# Patient Record
Sex: FEMALE
[De-identification: names, ages, dates, MRNs, and addresses within clinical notes are randomized per-mention and may not be internally consistent; named-entity substitution may affect disease eponyms.]

---

## 2017-04-20 ENCOUNTER — HOSPITAL ENCOUNTER (OUTPATIENT)
Dept: HOSPITAL 53 - M LAB REF | Age: 77
End: 2017-04-20
Attending: INTERNAL MEDICINE
Payer: MEDICARE

## 2017-04-20 DIAGNOSIS — R31.9: Primary | ICD-10-CM

## 2017-04-20 LAB
BACTERIA URNS QL MICRO: (no result)
MICROSCOPIC EXAM: (no result)
RBC #/AREA URNS HPF: (no result) /HPF (ref 0–3)
SQUAMOUS URNS QL MICRO: (no result) /HPF
WBC #/AREA URNS HPF: (no result) /HPF (ref 0–3)

## 2019-10-22 ENCOUNTER — HOSPITAL ENCOUNTER (OUTPATIENT)
Dept: HOSPITAL 53 - M LAB REF | Age: 79
End: 2019-10-22
Attending: INTERNAL MEDICINE
Payer: MEDICARE

## 2019-10-22 DIAGNOSIS — E03.9: Primary | ICD-10-CM

## 2019-12-14 ENCOUNTER — HOSPITAL ENCOUNTER (INPATIENT)
Dept: HOSPITAL 53 - M ED | Age: 79
DRG: 296 | End: 2019-12-14
Attending: SPECIALIST | Admitting: SPECIALIST
Payer: MEDICARE

## 2019-12-14 VITALS — DIASTOLIC BLOOD PRESSURE: 58 MMHG | SYSTOLIC BLOOD PRESSURE: 116 MMHG

## 2019-12-14 VITALS — DIASTOLIC BLOOD PRESSURE: 42 MMHG | SYSTOLIC BLOOD PRESSURE: 84 MMHG

## 2019-12-14 VITALS — SYSTOLIC BLOOD PRESSURE: 32 MMHG | DIASTOLIC BLOOD PRESSURE: 17 MMHG

## 2019-12-14 VITALS — SYSTOLIC BLOOD PRESSURE: 99 MMHG | DIASTOLIC BLOOD PRESSURE: 54 MMHG

## 2019-12-14 VITALS — DIASTOLIC BLOOD PRESSURE: 51 MMHG | SYSTOLIC BLOOD PRESSURE: 96 MMHG

## 2019-12-14 VITALS — SYSTOLIC BLOOD PRESSURE: 97 MMHG | DIASTOLIC BLOOD PRESSURE: 40 MMHG

## 2019-12-14 VITALS — DIASTOLIC BLOOD PRESSURE: 43 MMHG | SYSTOLIC BLOOD PRESSURE: 110 MMHG

## 2019-12-14 VITALS — DIASTOLIC BLOOD PRESSURE: 50 MMHG | SYSTOLIC BLOOD PRESSURE: 112 MMHG

## 2019-12-14 VITALS — SYSTOLIC BLOOD PRESSURE: 76 MMHG | DIASTOLIC BLOOD PRESSURE: 47 MMHG

## 2019-12-14 VITALS — DIASTOLIC BLOOD PRESSURE: 27 MMHG | SYSTOLIC BLOOD PRESSURE: 50 MMHG

## 2019-12-14 VITALS — DIASTOLIC BLOOD PRESSURE: 57 MMHG | SYSTOLIC BLOOD PRESSURE: 100 MMHG

## 2019-12-14 VITALS — SYSTOLIC BLOOD PRESSURE: 15 MMHG | DIASTOLIC BLOOD PRESSURE: 13 MMHG

## 2019-12-14 VITALS — SYSTOLIC BLOOD PRESSURE: 141 MMHG | DIASTOLIC BLOOD PRESSURE: 51 MMHG

## 2019-12-14 VITALS — DIASTOLIC BLOOD PRESSURE: 16 MMHG | SYSTOLIC BLOOD PRESSURE: 27 MMHG

## 2019-12-14 VITALS — DIASTOLIC BLOOD PRESSURE: 41 MMHG | SYSTOLIC BLOOD PRESSURE: 83 MMHG

## 2019-12-14 VITALS — DIASTOLIC BLOOD PRESSURE: 18 MMHG | SYSTOLIC BLOOD PRESSURE: 36 MMHG

## 2019-12-14 VITALS — SYSTOLIC BLOOD PRESSURE: 90 MMHG | DIASTOLIC BLOOD PRESSURE: 43 MMHG

## 2019-12-14 VITALS — DIASTOLIC BLOOD PRESSURE: 55 MMHG | SYSTOLIC BLOOD PRESSURE: 104 MMHG

## 2019-12-14 VITALS — DIASTOLIC BLOOD PRESSURE: 13 MMHG | SYSTOLIC BLOOD PRESSURE: 18 MMHG

## 2019-12-14 VITALS — DIASTOLIC BLOOD PRESSURE: 13 MMHG | SYSTOLIC BLOOD PRESSURE: 21 MMHG

## 2019-12-14 VITALS — DIASTOLIC BLOOD PRESSURE: 33 MMHG | SYSTOLIC BLOOD PRESSURE: 62 MMHG

## 2019-12-14 VITALS — DIASTOLIC BLOOD PRESSURE: 22 MMHG | SYSTOLIC BLOOD PRESSURE: 43 MMHG

## 2019-12-14 VITALS — SYSTOLIC BLOOD PRESSURE: 85 MMHG | DIASTOLIC BLOOD PRESSURE: 52 MMHG

## 2019-12-14 VITALS — SYSTOLIC BLOOD PRESSURE: 104 MMHG | DIASTOLIC BLOOD PRESSURE: 43 MMHG

## 2019-12-14 VITALS — DIASTOLIC BLOOD PRESSURE: 43 MMHG | SYSTOLIC BLOOD PRESSURE: 90 MMHG

## 2019-12-14 VITALS — SYSTOLIC BLOOD PRESSURE: 56 MMHG | DIASTOLIC BLOOD PRESSURE: 30 MMHG

## 2019-12-14 VITALS — DIASTOLIC BLOOD PRESSURE: 24 MMHG | SYSTOLIC BLOOD PRESSURE: 47 MMHG

## 2019-12-14 VITALS — SYSTOLIC BLOOD PRESSURE: 17 MMHG | DIASTOLIC BLOOD PRESSURE: 13 MMHG

## 2019-12-14 VITALS — DIASTOLIC BLOOD PRESSURE: 55 MMHG | SYSTOLIC BLOOD PRESSURE: 106 MMHG

## 2019-12-14 VITALS — SYSTOLIC BLOOD PRESSURE: 121 MMHG | DIASTOLIC BLOOD PRESSURE: 47 MMHG

## 2019-12-14 VITALS — DIASTOLIC BLOOD PRESSURE: 304 MMHG | SYSTOLIC BLOOD PRESSURE: 305 MMHG

## 2019-12-14 VITALS — DIASTOLIC BLOOD PRESSURE: 13 MMHG | SYSTOLIC BLOOD PRESSURE: 13 MMHG

## 2019-12-14 VITALS — SYSTOLIC BLOOD PRESSURE: 113 MMHG | DIASTOLIC BLOOD PRESSURE: 43 MMHG

## 2019-12-14 VITALS — DIASTOLIC BLOOD PRESSURE: 52 MMHG | SYSTOLIC BLOOD PRESSURE: 100 MMHG

## 2019-12-14 VITALS — DIASTOLIC BLOOD PRESSURE: 31 MMHG | SYSTOLIC BLOOD PRESSURE: 58 MMHG

## 2019-12-14 VITALS — DIASTOLIC BLOOD PRESSURE: 59 MMHG | SYSTOLIC BLOOD PRESSURE: 164 MMHG

## 2019-12-14 VITALS — DIASTOLIC BLOOD PRESSURE: 68 MMHG | SYSTOLIC BLOOD PRESSURE: 193 MMHG

## 2019-12-14 VITALS — SYSTOLIC BLOOD PRESSURE: 173 MMHG | DIASTOLIC BLOOD PRESSURE: 62 MMHG

## 2019-12-14 VITALS — SYSTOLIC BLOOD PRESSURE: 103 MMHG | DIASTOLIC BLOOD PRESSURE: 44 MMHG

## 2019-12-14 VITALS — DIASTOLIC BLOOD PRESSURE: 20 MMHG | SYSTOLIC BLOOD PRESSURE: 39 MMHG

## 2019-12-14 DIAGNOSIS — I47.2: ICD-10-CM

## 2019-12-14 DIAGNOSIS — K20.8: ICD-10-CM

## 2019-12-14 DIAGNOSIS — Z66: ICD-10-CM

## 2019-12-14 DIAGNOSIS — E87.4: ICD-10-CM

## 2019-12-14 DIAGNOSIS — K58.9: ICD-10-CM

## 2019-12-14 DIAGNOSIS — A08.4: ICD-10-CM

## 2019-12-14 DIAGNOSIS — Z79.899: ICD-10-CM

## 2019-12-14 DIAGNOSIS — I44.2: ICD-10-CM

## 2019-12-14 DIAGNOSIS — Z95.0: ICD-10-CM

## 2019-12-14 DIAGNOSIS — R57.9: ICD-10-CM

## 2019-12-14 DIAGNOSIS — N17.0: ICD-10-CM

## 2019-12-14 DIAGNOSIS — D69.6: ICD-10-CM

## 2019-12-14 DIAGNOSIS — I46.9: Primary | ICD-10-CM

## 2019-12-14 DIAGNOSIS — I10: ICD-10-CM

## 2019-12-14 DIAGNOSIS — E78.5: ICD-10-CM

## 2019-12-14 LAB
ALBUMIN SERPL BCG-MCNC: 2.3 GM/DL (ref 3.2–5.2)
ALBUMIN SERPL BCG-MCNC: 2.9 GM/DL (ref 3.2–5.2)
ALBUMIN SERPL BCG-MCNC: 3.4 GM/DL (ref 3.2–5.2)
ALT SERPL W P-5'-P-CCNC: 105 U/L (ref 12–78)
ALT SERPL W P-5'-P-CCNC: 129 U/L (ref 12–78)
ALT SERPL W P-5'-P-CCNC: 58 U/L (ref 12–78)
AMYLASE SERPL-CCNC: 14 U/L (ref 25–115)
ANISOCYTOSIS BLD QL SMEAR: (no result)
APTT BLD: 37.7 SECONDS (ref 25–38.4)
APTT BLD: 42.9 SECONDS (ref 25–38.4)
APTT BLD: > 240 SECONDS (ref 25–38.4)
BASE EXCESS BLDA CALC-SCNC: -17.9 MMOL/L (ref -2–2)
BASE EXCESS BLDA CALC-SCNC: -18.2 MMOL/L (ref -2–2)
BASE EXCESS BLDA CALC-SCNC: -20.8 MMOL/L (ref -2–2)
BASE EXCESS BLDA CALC-SCNC: -21 MMOL/L (ref -2–2)
BASE EXCESS BLDA CALC-SCNC: -8.7 MMOL/L (ref -2–2)
BILIRUB CONJ SERPL-MCNC: 0.5 MG/DL (ref 0–0.2)
BILIRUB SERPL-MCNC: 1.3 MG/DL (ref 0.2–1)
BILIRUB SERPL-MCNC: 2.2 MG/DL (ref 0.2–1)
BILIRUB SERPL-MCNC: 2.5 MG/DL (ref 0.2–1)
BUN SERPL-MCNC: 41 MG/DL (ref 7–18)
BUN SERPL-MCNC: 42 MG/DL (ref 7–18)
BUN SERPL-MCNC: 51 MG/DL (ref 7–18)
CALCIUM SERPL-MCNC: 6.9 MG/DL (ref 8.8–10.2)
CALCIUM SERPL-MCNC: 8.1 MG/DL (ref 8.8–10.2)
CALCIUM SERPL-MCNC: 8.8 MG/DL (ref 8.8–10.2)
CHLORIDE SERPL-SCNC: 100 MEQ/L (ref 98–107)
CHLORIDE SERPL-SCNC: 106 MEQ/L (ref 98–107)
CHLORIDE SERPL-SCNC: 107 MEQ/L (ref 98–107)
CK MB CFR.DF SERPL CALC: 0.33
CK MB SERPL-MCNC: 3.9 NG/ML (ref ?–3.6)
CK SERPL-CCNC: 1184 U/L (ref 26–192)
CO2 BLDA CALC-SCNC: 10.1 MEQ/L (ref 23–31)
CO2 BLDA CALC-SCNC: 18 MEQ/L (ref 23–31)
CO2 BLDA CALC-SCNC: 5.2 MEQ/L (ref 23–31)
CO2 BLDA CALC-SCNC: 6.7 MEQ/L (ref 23–31)
CO2 BLDA CALC-SCNC: 7.7 MEQ/L (ref 23–31)
CO2 SERPL-SCNC: 10 MEQ/L (ref 21–32)
CO2 SERPL-SCNC: 15 MEQ/L (ref 21–32)
CO2 SERPL-SCNC: 9 MEQ/L (ref 21–32)
CREAT SERPL-MCNC: 1.15 MG/DL (ref 0.55–1.3)
CREAT SERPL-MCNC: 1.42 MG/DL (ref 0.55–1.3)
CREAT SERPL-MCNC: 1.79 MG/DL (ref 0.55–1.3)
D DIMER PPP DDU-MCNC: > 4000 NG/ML (ref ?–500)
EOSINOPHIL NFR BLD MANUAL: 1 % (ref 0–3)
GFR SERPL CREATININE-BSD FRML MDRD: 29.1 ML/MIN/{1.73_M2} (ref 39–?)
GFR SERPL CREATININE-BSD FRML MDRD: 38 ML/MIN/{1.73_M2} (ref 39–?)
GFR SERPL CREATININE-BSD FRML MDRD: 48.5 ML/MIN/{1.73_M2} (ref 39–?)
GLUCOSE SERPL-MCNC: 159 MG/DL (ref 70–100)
GLUCOSE SERPL-MCNC: 160 MG/DL (ref 70–100)
GLUCOSE SERPL-MCNC: 83 MG/DL (ref 70–100)
HCO3 BLDA-SCNC: 16.9 MEQ/L (ref 22–26)
HCO3 BLDA-SCNC: 4.8 MEQ/L (ref 22–26)
HCO3 BLDA-SCNC: 6.3 MEQ/L (ref 22–26)
HCO3 BLDA-SCNC: 7 MEQ/L (ref 22–26)
HCO3 BLDA-SCNC: 9.2 MEQ/L (ref 22–26)
HCO3 STD BLDA-SCNC: 10.8 MEQ/L (ref 22–26)
HCO3 STD BLDA-SCNC: 11.3 MEQ/L (ref 22–26)
HCO3 STD BLDA-SCNC: 17.5 MEQ/L (ref 22–26)
HCO3 STD BLDA-SCNC: 8.8 MEQ/L (ref 22–26)
HCO3 STD BLDA-SCNC: 9.9 MEQ/L (ref 22–26)
HCT VFR BLD AUTO: 46.6 % (ref 36–47)
HCT VFR BLD AUTO: 46.8 % (ref 36–47)
HCT VFR BLD AUTO: 50 % (ref 36–47)
HGB BLD-MCNC: 13.8 G/DL (ref 12–15.5)
HGB BLD-MCNC: 14 G/DL (ref 12–15.5)
HGB BLD-MCNC: 15.2 G/DL (ref 12–15.5)
INR PPP: 1.6
INR PPP: 1.92
INR PPP: 2.86
LIPASE SERPL-CCNC: 34 U/L (ref 73–393)
LYMPHOCYTES NFR BLD MANUAL: 38 % (ref 16–44)
MACROCYTES BLD QL SMEAR: (no result)
MAGNESIUM SERPL-MCNC: 2.7 MG/DL (ref 1.8–2.4)
MAGNESIUM SERPL-MCNC: 2.8 MG/DL (ref 1.8–2.4)
MAGNESIUM SERPL-MCNC: 2.8 MG/DL (ref 1.8–2.4)
MCH RBC QN AUTO: 30.2 PG (ref 27–33)
MCH RBC QN AUTO: 30.7 PG (ref 27–33)
MCH RBC QN AUTO: 30.8 PG (ref 27–33)
MCHC RBC AUTO-ENTMCNC: 29.5 G/DL (ref 32–36.5)
MCHC RBC AUTO-ENTMCNC: 30 G/DL (ref 32–36.5)
MCHC RBC AUTO-ENTMCNC: 30.4 G/DL (ref 32–36.5)
MCV RBC AUTO: 100.6 FL (ref 80–96)
MCV RBC AUTO: 101 FL (ref 80–96)
MCV RBC AUTO: 104.5 FL (ref 80–96)
METAMYELOCYTES NFR BLD MANUAL: 1 % (ref 0–0)
MONOCYTES NFR BLD MANUAL: 5 % (ref 0–5)
NEUTROPHILS NFR BLD MANUAL: 48 % (ref 28–66)
PCO2 BLDA: 12.1 MMHG (ref 35–45)
PCO2 BLDA: 12.2 MMHG (ref 35–45)
PCO2 BLDA: 21 MMHG (ref 35–45)
PCO2 BLDA: 27.6 MMHG (ref 35–45)
PCO2 BLDA: 31 MMHG (ref 35–45)
PH BLDA: 7.13 UNITS (ref 7.35–7.45)
PH BLDA: 7.14 UNITS (ref 7.35–7.45)
PH BLDA: 7.22 UNITS (ref 7.35–7.45)
PH BLDA: 7.33 UNITS (ref 7.35–7.45)
PH BLDA: 7.34 UNITS (ref 7.35–7.45)
PHOSPHATE SERPL-MCNC: 5.8 MG/DL (ref 2.5–4.9)
PLATELET # BLD AUTO: 80 10^3/UL (ref 150–450)
PLATELET # BLD AUTO: 93 10^3/UL (ref 150–450)
PLATELET # BLD AUTO: 98 10^3/UL (ref 150–450)
PLATELET BLD QL SMEAR: (no result)
PO2 BLDA: 141.2 MMHG (ref 75–100)
PO2 BLDA: 170.2 MMHG (ref 75–100)
PO2 BLDA: 277.9 MMHG (ref 75–100)
PO2 BLDA: 66 MMHG (ref 75–100)
PO2 BLDA: 95.3 MMHG (ref 75–100)
POLYCHROMASIA BLD QL SMEAR: (no result)
POTASSIUM SERPL-SCNC: 4.5 MEQ/L (ref 3.5–5.1)
POTASSIUM SERPL-SCNC: 4.5 MEQ/L (ref 3.5–5.1)
POTASSIUM SERPL-SCNC: 4.7 MEQ/L (ref 3.5–5.1)
PROT SERPL-MCNC: 5 GM/DL (ref 6.4–8.2)
PROT SERPL-MCNC: 6.2 GM/DL (ref 6.4–8.2)
PROT SERPL-MCNC: 6.9 GM/DL (ref 6.4–8.2)
PROTHROMBIN TIME: 18.8 SECONDS (ref 11.8–14)
PROTHROMBIN TIME: 21.7 SECONDS (ref 11.8–14)
PROTHROMBIN TIME: 29.9 SECONDS (ref 11.8–14)
RBC # BLD AUTO: 4.48 10^6/UL (ref 4–5.4)
RBC # BLD AUTO: 4.63 10^6/UL (ref 4–5.4)
RBC # BLD AUTO: 4.95 10^6/UL (ref 4–5.4)
SAO2 % BLDA: 94.4 % (ref 95–99)
SAO2 % BLDA: 96.3 % (ref 95–99)
SAO2 % BLDA: 98.1 % (ref 95–99)
SAO2 % BLDA: 98.8 % (ref 95–99)
SAO2 % BLDA: 99.5 % (ref 95–99)
SODIUM SERPL-SCNC: 136 MEQ/L (ref 136–145)
SODIUM SERPL-SCNC: 137 MEQ/L (ref 136–145)
SODIUM SERPL-SCNC: 141 MEQ/L (ref 136–145)
T4 FREE SERPL-MCNC: 1.13 NG/DL (ref 0.76–1.46)
TROPONIN I SERPL-MCNC: 1.09 NG/ML (ref ?–0.1)
TSH SERPL DL<=0.005 MIU/L-ACNC: 8.49 UIU/ML (ref 0.36–3.74)
VARIANT LYMPHS NFR BLD MANUAL: 2 % (ref 0–5)
WBC # BLD AUTO: 10.1 10^3/UL (ref 4–10)
WBC # BLD AUTO: 22.5 10^3/UL (ref 4–10)
WBC # BLD AUTO: 23.1 10^3/UL (ref 4–10)
WBC TOXIC VACUOLES BLD QL SMEAR: (no result)

## 2019-12-14 PROCEDURE — 06HM33Z INSERTION OF INFUSION DEVICE INTO RIGHT FEMORAL VEIN, PERCUTANEOUS APPROACH: ICD-10-PCS | Performed by: SPECIALIST

## 2019-12-14 PROCEDURE — 5A1935Z RESPIRATORY VENTILATION, LESS THAN 24 CONSECUTIVE HOURS: ICD-10-PCS | Performed by: SPECIALIST

## 2019-12-14 PROCEDURE — 02HV33Z INSERTION OF INFUSION DEVICE INTO SUPERIOR VENA CAVA, PERCUTANEOUS APPROACH: ICD-10-PCS | Performed by: SPECIALIST

## 2019-12-14 PROCEDURE — 0BH17EZ INSERTION OF ENDOTRACHEAL AIRWAY INTO TRACHEA, VIA NATURAL OR ARTIFICIAL OPENING: ICD-10-PCS | Performed by: EMERGENCY MEDICINE

## 2019-12-14 PROCEDURE — 04HY32Z INSERTION OF MONITORING DEVICE INTO LOWER ARTERY, PERCUTANEOUS APPROACH: ICD-10-PCS | Performed by: SPECIALIST

## 2019-12-14 RX ADMIN — SODIUM CHLORIDE SCH MLS/HR: 9 INJECTION, SOLUTION INTRAVENOUS at 14:00

## 2019-12-14 RX ADMIN — DEXTROSE MONOHYDRATE SCH MLS/HR: 50 INJECTION, SOLUTION INTRAVENOUS at 11:32

## 2019-12-14 RX ADMIN — SODIUM CHLORIDE SCH MLS/HR: 9 INJECTION, SOLUTION INTRAVENOUS at 15:00

## 2019-12-14 RX ADMIN — SODIUM CHLORIDE SCH MLS/HR: 9 INJECTION, SOLUTION INTRAVENOUS at 14:30

## 2019-12-14 RX ADMIN — DEXTROSE MONOHYDRATE SCH MLS/HR: 50 INJECTION, SOLUTION INTRAVENOUS at 11:44

## 2019-12-14 RX ADMIN — VECURONIUM BROMIDE SCH MLS/HR: 1 INJECTION, POWDER, LYOPHILIZED, FOR SOLUTION INTRAVENOUS at 12:00

## 2019-12-14 RX ADMIN — Medication SCH MLS/HR: at 11:30

## 2019-12-14 RX ADMIN — Medication SCH MLS/HR: at 09:30

## 2019-12-14 RX ADMIN — SODIUM CHLORIDE SCH MLS/HR: 9 INJECTION, SOLUTION INTRAVENOUS at 14:15

## 2019-12-14 RX ADMIN — DEXTROSE MONOHYDRATE SCH MLS/HR: 50 INJECTION, SOLUTION INTRAVENOUS at 11:30

## 2019-12-14 RX ADMIN — VECURONIUM BROMIDE SCH MLS/HR: 1 INJECTION, POWDER, LYOPHILIZED, FOR SOLUTION INTRAVENOUS at 12:03

## 2019-12-14 RX ADMIN — DEXTROSE MONOHYDRATE SCH MLS/HR: 50 INJECTION, SOLUTION INTRAVENOUS at 12:00

## 2019-12-14 RX ADMIN — Medication SCH MLS/HR: at 09:09

## 2019-12-14 RX ADMIN — SODIUM CHLORIDE SCH MLS/HR: 9 INJECTION, SOLUTION INTRAVENOUS at 13:52

## 2019-12-14 RX ADMIN — SODIUM CHLORIDE SCH MLS/HR: 9 INJECTION, SOLUTION INTRAVENOUS at 14:45

## 2019-12-14 NOTE — CR
DATE OF CONSULTATION:  12/14/2019

 

REFERRING PHYSICIAN:  Dr. Milner

 

INDICATION:  Cardiac arrest.

 

HISTORY OF PRESENT ILLNESS:  Mrs. Fortune is previously unknown to me.  She is

a 79-year-old female who has been followed by my partner Dr. Cha.  She presented

to [facility] ([facility]) emergency room (ER) by ambulance after her

 called [facility] (EMS) because she lost consciousness.

Her  is a frail man, but he is able to provide somewhat limited history.

What we can gather is she has not been feeling well since Thursday.  He felt 
that

it was a consequence of eating some food in a diner.  On Friday, she supposedly

had intermittent vomiting of small amount and a lot of retching but not that 
much

vomitus as he can recall.  Then, this morning she apparently was trying to take 
a

sip of water and then collapsed.  He was able to hold her and let her slide on

the ground.  She at this point was still talking to him and trying to get up but

at some point she lost consciousness.  He called 911.  On arrival of EMS, there

was automatic defibrillator applied and shock was advised and delivered.  When

the paramedic arrived on the scene few minutes later, she was in pulseless

electrical activity.  She received epinephrine and had brief chest compressions.

She was loaded to the ambulance and then developed sustained ventricular

tachycardia that was terminated by two shocks and administration of amiodarone.

On arrival to emergency room, she was in sinus rhythm with asynchronously

ventricular paced rhythm.  She was relatively hemodynamically stable with blood

pressure in 90s and low 100s, and during her ER stay before I saw her and after 
I

saw her there were no arrhythmias.

 

The patient does not have any history of coronary artery disease.  She had an

echocardiogram in June 2019 that revealed preserved left ventricular systolic

function.  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

degree atrioventricular (AV) block and complete heart block and pacemaker

placement in 2006.  The pacemaker was replaced in 2008 after there was

malfunction of atrial lead.  Surprisingly to me she was left with only

ventricular pacemaker programed  in the VVIR mode even though she remained in 
sinus

rhythm.  I interrogated the device in emergency room, and unfortunately, it is

not programmed to store any arrhythmic events and consequently does not provide

any information about the events of earlier today.  She still has about a year

and half of battery life safe and she is pacemaker dependent.

 

Otherwise, past medical history is positive for hypertension, dyslipidemia and

irritable bowel syndrome.  According to her , she had a history of

gastrointestinal (GI) bleeding that is rather remote.

 

Surgical history is positive for pacemaker placement and then revision in 2006

and 2008, and right breast lumpectomy.

 

SOCIAL HISTORY:  The patient does not smoke, does not drink.  She is .

 

FAMILY HISTORY:  No longer relevant.

 

REVIEW OF SYSTEMS:  Unobtainable.  According to her , there were no

unusual events until Thursday evening with GI symptoms.

 

PHYSICAL EXAMINATION:

The patient is intubated, sedated, certainly no communication possible.

Blood pressure 126/49, heart rate 60 beats per minute with underlying sinus

rhythm and asynchronously paced rhythm.  Saturation 94% on 50% FiO2.

Her jugular venous pulse (JVP) looks high.

Lungs are clear to auscultation, though I do not appreciate any wheezing,

crackles or rhonchi.

Heart exam reveals regular rhythm.  I do not appreciate any obvious murmur or

gallop.

Abdomen is soft.  No guarding.  Bowel sounds are present.

Extremities are free of edema.  Peripheral pulses are palpable.

Neurologically, her pupils are constricted.  I do not appreciate any obvious gag

reflex.  I did not test corneal reflex.

Her extremities are rather flaccid

 

LABORATORY:  CBC reveals hemoglobin of 14.0, hematocrit 46, platelet count

98,000, and WBC count 10.1.

 

Basic metabolic panel reveals sodium 136, potassium 4.5, BUN 41, creatinine 1.5,

glucose 160, lactic acid 11, magnesium 2.8, AST 79, ALT 58, troponin 1.09.  CK

1180, relative index 0.33, lipase 34.  TSH 8.5.

 

Urinalysis is positive for 2+ protein and 3+ blood.

 

ASSESSMENT AND PLAN:  Mrs. Fortune is a 79-year-old female without prior

history of coronary artery disease or congestive heart failure, but with 
complete

heart block and only ventricular paced rhythm who presented with ventricular

tachycardia documented in the ambulance and probably ventricular fibrillation on

arrival of EMT.  It is unclear how long she was without effective 
cardiopulmonary

resuscitation (CPR) prior to arrival of emergency room medical technician  
(EMT),

but it is estimated approximately 10-15 minutes.  Currently, she is

hemodynamically stable.  I spoke with Dr. Ga, who is going to be the

admitting attending from critical care, and we plan to perform cooling protocol

under her direction. From cardiac perspective, at this point I believe that the

principal outcome will be dependent on neurologic function.  I do not see any

immediate indication for patient going to the cath lab.  She does have

uninterpretable EKG  due to underlying ventricular pacing and her troponin

elevation that is fairly minimal and probably reflects the ventricular

tachycardia and CPR.  We will provide purely supportive therapy.  She has

coffee-ground material in her nasogastric (NG) tube and has low platelet count,

and consequently, I believe that we should stay away from administration of

anticoagulation until we can determine that her hemoglobin/hematocrit remain

stable.  I also will not administer any antiarrhythmics unless she has recurrent

arrhythmias, at which point amiodarone will be administered.

 

I spoke with her  and daughter-in-law and explained that unfortunately 
her

prognosis is very poor, statistically speaking her chances of leaving hospital 
in

favorable condition are less than 5%.  I asked the family to consider if further

resuscitative measures would be administered should she have any the

decompensation.  At this point, we will provide full support.

REYNA

## 2019-12-14 NOTE — HPE
DATE OF ADMISSION:  12/14/2019

 

CHIEF COMPLAINT: Cardiac arrest.

 

HISTORY OF PRESENT ILLNESS:  Ms. Fortune is a 79-year-old female with a past

medical history of high-degree atrioventricular (AV) block and a complete heart

block status post pacemaker, who presented to the emergency department (ED) with

loss of consciousness.  History was obtained from the patient's  as well

as collateral information, as she is intubated and unable to provide a history.

As per the , the patient had come home on Thursday after she had been out

eating in a diner and started complaining of some nausea and vomiting.  The

patient was concerned that she may have eaten some bad food when she was out.  
On

Friday, she continued to have some intermittent vomiting and retching but did 
not

report any episodes of melena or coffee-ground emesis.  She had not reported any

chest pain, shortness of breath, or difficulty breathing.  Earlier this morning,

she was attempting to go to the kitchen, when she became weak and collapsed.

Patient's  was able to slide her down to the floor, where she was

initially talking to him and then becoming less responsive and appeared to have

some gasping breaths.  Then 911 was called, and initially the fire department

arrived with automated external defibrillator (AED) , and the patient received

two shocks, which were advised.  Paramedics then arrived on scene, and she was 
in

pulseless electrical activity (PEA) at that time.  The patient received

epinephrine and cardiopulmonary resuscitation (CPR) and was noted to be in

ventricular tachycardia (VT ), for which she was delivered another two shocks as

well as amiodarone.  The patient achieved return of spontaneous circulation

(ROSC) at that time with estimated 15-20 minutes before ROSC was achieved.  In

the ED, the patient was in sinus rhythm with a synchronously ventricular paced

rhythm with a blood pressure that she was maintaining without the aid of

vasopressors.  She was minimally responsive, however, and was intubated in the

ED.  The patient also received broad-spectrum antibiotics in the ED and

intravenous (IV) fluid boluses of NS at 30ml/kg.

 

PAST MEDICAL/SURGICAL HISTORY:

1.  Hypertension.

2.  Hyperlipidemia.

3.  Irritable bowel syndrome.

4.  History of remote gastrointestinal (GI) bleed.

5.  History of high-degree AV block and a complete heart block with pacemaker

placement in 2006.  The pacemaker was replaced in 2008 after there was a

malfunction of an atrial lead.

6.  Right breast lumpectomy.

 

HOME MEDICATIONS:  Alendronate, multivitamin supplement.

 

ALLERGIES.  No known drug allergies.

 

SOCIAL HISTORY:  Denies history of smoking or alcohol use.

 

REVIEW SYSTEMS:  Not able to be obtained, as the patient is intubated and unable

to provide a history.

 

PHYSICAL EXAMINATION:

Maximal temperature 100.9, current temperature 96, pulse 60, blood pressure was

99/54, oxygen saturation was 94% on the ventilator.

GENERAL: Patient is intubated and sedated on propofol.  She is minimally

responsive to painful stimulation and not following any commands.

HEENT:  Normocephalic, atraumatic.  Pupils are small but reactive. Mucous

membranes are moist.

NECK:  Supple.  Trachea is midline.  Evidence of jugular venous distention 
(JVD).

Orogastric (OG) tube is in place draining some bilious output.

CARDIAC:  Regular rate and rhythm.  No appreciable murmurs or gallops noted.

LUNGS:  Coarse ventilated breath sounds bilaterally but clear to auscultation

with no significant wheezing, rales, or rhonchi.

ABDOMEN:  Soft, nontender, nondistended.

LOWER EXTREMITIES:  Cold.  There is no significant lower extremity edema

bilaterally.

 

LABORATORY DATA:

WBC 10.1, hemoglobin 14.0, platelets are 98.

 

Chemistry:  Sodium is 136, potassium 4.5, chloride 100, bicarbonate 15, BUN 41,

creatinine is 1.16, glucose 160, lactic acid was 11.3. Total bilirubin was 1.3,

AST 79, ALT 58, alkaline phosphatase is 109.  TSH was 8.490, free T4 is 1.13.

INR is 1.60.

Troponin initial 1.09.  BNP was 24,800.

 

Initial ABG:  A pH was 7.143, pCO2 of 27.6, pO2 of 141.2.  Repeat ABG was pH of

7.325, pCO2 of 12.1, pO2 of 277.9.

 

Urinalysis (UA) negative for nitrites and leukocyte esterase.  No bacteria.

 

IMAGING:

Chest x-ray shows endotracheal tube in good position.  There is cardiomegaly.

The atrial and one of the ventricular cardiac pacemaker leads are fractured.  
One

of the right ventricular cardiac leads has retracted and has looped upon itself

in the right ventricle.  There are no opacities noted in the lungs.

 

ASSESSMENT AND PLAN:  Ms. Fortune is a 79-year-old female with a history of

complete heart block with only a ventricular paced rhythm, who presented with 
out

of hospital cardiac arrest with what sounds to be a ventricular arrhythmia

initially, which progressed to PEA and then was back in VT again.  It is unclear

how long the patient was without effective CPR prior to the arrival of the

paramedics, as she was seen initially by fire fighters.  On arrival of Emergency

Medical Team (EMT), she was in PEA.  It is estimated her ROSC from paramedic

arrival was approximately 15-20 minutes.  On arrival to the emergency department

(ED), the patient was agonally breathing and was intubated.  She was minimally

responsive, not following commands. 

 

1.  Neurologic.  The patient is a status post cardiac arrest.  Post arrest 
Crosby

Coma Score (GSC) was less than 8 with a estimated ROSC of approximately 20

minutes.  The patient has likely VT/ventricular fibrillation cardiac arrest.

Therefore, will start her on hypothermia protocol.

- Continue with hypothermia protocol as per intensive care unit (ICU).  Will put

target temperature at 36 degrees Celsius.

- Will continue with propofol and fentanyl for sedation and analgesia as well as

Nimbex for paralytic while on the hypothermia protocol.  Will target a Mich of

5 with a train-of-four of 2/4.

- Discussed with the patient's  and daughter at bedside that it is 
unclear

if she has anoxic brain injury, but it is definitely in consideration, and that

her prognosis with an out of hospital cardiac arrest is poor in terms of

favorable outcome and recovery.  Will continue to monitor, however, and will

continue to address goals of care with the patient's family.  After some

discussion, they did elect to make the patient DO NOT RESUSCITATE at this time

but no withdrawal of care.

 

2.  Cardiovascular:  A history of AV block and complete heart block with a

pacemaker with a ventricularly paced rhythm.  Presented with out of hospital

cardiac arrest, likely ventricular fibrillation/VT.  Post arrest, patient was

initially maintaining a blood pressure, however, became hypotensive, requiring

the administration of Levophed for vasopressor support.

- Appreciate cardiology consult and recommendations.  The patient has some

elevated cardiac enzymes, likely due to her cardiac arrest.  Given her

thrombocytopenia, will hold off on anticoagulation for now but continue

supportive measures.

- If patient were to have another ventricular arrhythmia, would likely need

amiodarone, as she appears to be ventricularly paced currently.

- Will continue to monitor her cardiac enzymes.

- Will get echo for evaluation.

- The patient has lactic acidosis in the setting of her cardiac arrest as well 
as

a possible sepsis.  Will followup and continue to trend repeat lactate.

- Continue with Levophed via a right internal jugular (IJ) triple lumen which 
was

placed to maintain a mean arterial pressure (MAP) above 65.  The patient has a

left femoral arterial line, which was placed as well, for blood pressure

monitoring and for frequent arterial blood gases (ABGs) while on hypothermia

protocol.

 

3.  Pulmonary:  The patient has no previous history of any pulmonary disease.

She was intubated after her cardiac arrest and is on mechanical ventilation.

- Continue with mechanical ventilation with volume control mode with settings of

340/24/108.  Will continue wean down her FiO2 as tolerated to maintain oxygen

saturation above 90%.

- Continue with ventilator bundle with head of bed elevation and chlorhexidine

mouthwash.  Continue with daily chest x-rays and ABGs while intubated.

 

4.  Infectious disease (ID)/gastrointestinal (GI):  The patient had symptoms of

vomiting prior to her admission.  She had reportedly ingested some questionable

food.  There is a question of a viral gastroenteritis versus a bacterial

gastroenteritis.  She did receive broad-spectrum antibiotics in the ED for

possible sepsis with her leukocytosis and fever on admission.

- There was question also of coffee-ground emesis.  GI was consulted.  
Appreciate

his recommendations and will continue with conservative management for now and

with proton pump inhibitor (PPI) twice a day and monitoring her hemoglobin and

hematocrit.

- Will check a GI panel and continue with ciprofloxacin empirically for a

possible bacterial gastroenteritis, although suspect most likely is a viral

gastroenteritis.

- Continue OG tube to low wall intermittent suction.

- The patient had elevated transaminitis, likely in the setting of shock liver

with her cardiac arrest.  Will continue to trend liver function testing.

 

5.  Renal/endocrine.  The patient presented with acute kidney injury (CLAIR),

likely in the setting of her cardiac arrest and shock.  The patient has had

decreasing urine output, and suspect that she is in acute tubular necrosis (ATN)

from her shock and hypoperfusion.  The patient also has persistent metabolic

acidosis, for which we are attempting to compensate with the ventilator with a

respiratory alkalosis.  We will consult renal for further recommendations given

her oliguria and acidosis with need for possible CVVHD

- Patient is status post 1 ampule of bicarbonate in the ED and was given an

additional amp of bicarbonate in the ICU for her worsening acidosis and

hypotension.  Her blood pressures did respond after the ampule of bicarbonate

that she received in the ICU.

- Will continue monitoring her ins and outs via James.

- Patient's fingerstick glucoses have also been decreasing likely due to her

liver dysfunction from her shock liver.  Will start her on D5W and continue

monitoring fingerstick glucose

 

6.  Heme.  The patient had some mild thrombocytopenia on admission.  There was

also question of coffee-ground emesis.  Her hemoglobin did not require any

transfusion.

- Will keep an active type and screen and continue to monitor for signs of

bleeding.

- The patient had some coagulopathy with INR being elevated, likely in the

setting of her liver dysfunction.

- Will hold her heparin and continue with sequential compression devices (SCDs)

for deep vein thrombosis (DVT) prophylaxis.

 

Code status.  After discussion with the patient's  and her daughter,

the decision was made for the patient the need to be DO NOT RESUSCITATE.  They 
do

not wish to withdraw any care at this time.

 

Total critical air time spent, not including any procedures:  Approximately 2

hours and 45 minutes.

MTDD

## 2019-12-14 NOTE — CR
DATE OF CONSULTATION: 2019

 

STATUS OF PATIENT: Inpatient.

 

REQUESTING PHYSICIAN:  Dr. Ga

 

REASON FOR CONSULTATION:  Coffee-ground via nasogastric (NG) tube.

 

HISTORY OF PRESENT ILLNESS:  This is a 79-year-old female who presented to the

emergency room status post cardiac arrest and resuscitated in the field and a

history of recent gastrointestinal (GI) illness.  During intubation she also had

an NG tube placed, which drained coffee-ground type material.  This history is

mainly obtained from her , who is available at bedside, and from 
emergency

room (ER)/ambulance notes.

 

The patient apparently is a fairly healthy 79-year-old female with a past 
medical

history only for a pacemaker placement in .  She has had breast surgery for 
a

benign breast lesion several years ago and has had a colonoscopy in .  Her

only medications on the chart are alendronate, and her  confirms that she

is fairly healthy overall.  He states that on Thursday, or 2 days ago, with her

friends and family, after which in the evening she developed an upset stomach 
and

had several episodes of vomiting on Thursday evening and throughout Friday.  
This

morning she was still not feeling well and actually collapsed on the kitchen

floor, at which point 911 was called.  There is no history of any diarrheal

symptoms.  There is no history of any fevers.  There is no history of any 
chronic

gastrointestinal (GI)illnesses.

 

MEDICATIONS:  Alendronate.

 

ALLERGIES:  No known drug allergies.

 

MEDICAL HISTORY:

1.  Irregular heartbeat and pacemaker placement .

2.  Breast surgery for benign breast lesion.

3.  Routine colonoscopy in .

 

REVIEW OF SYSTEMS:  Unobtainable.

 

FAMILY HISTORY: Noncontributory.

 

PHYSICAL EXAMINATION:

Temperature 100.9, blood pressure 145/69, respiratory rate is 24 on ventilator.

GENERAL:  She is unresponsive.  White female.  Endotracheal and nasogastric tube

in place.  Gastric tube draining clear yellow-brown fluid with a few flecks of

coffee-ground type material.

HEAD, EYES, EARS, NOSE AND THROAT:  Otherwise normal.

CHEST:  Coarse throughout, but no rhonchi or crackles definitely appreciated.

HEART:  Regular rate and rhythm.  No murmurs.

ABDOMEN:  Soft, positive bowel sounds.

EXTREMITIES:  Negative for edema.

 

IMPRESSION:

1.  Nausea, vomiting times three days.  Minor coffee-ground type 
material/hematin

via nasogastric tube.

2.  The patient is status post cardiac arrest, successfully resuscitated thus

far.

 

DISCUSSION:  With a normal hemoglobin and only coffee-ground material present in

her NG tube, the most likely scenario is that of erosive esophagitis caused by a

3-day stint of nausea and vomiting episodes prior to her cardiac arrest.  At 
this

time, her hemoglobin is stable, and there is no need for urgent endoscopy at 
this

time.  I would recommend the followin.  Continue to monitor effluent from NG tube.

2.  Proton pump inhibitor (PPI) should be given intravenous (IV) twice a day.

3.  Monitor hemoglobin and hematocrit.  

[facility]

## 2019-12-14 NOTE — REP
Portable chest x-ray:  Single view.

 

History:  Central line placement.

 

Findings:  EKG monitoring electrodes overlie the chest.  Nasogastric tube has

been inserted into the gastric fundus.  Endotracheal tube is seen in good

position at the level of the transverse aorta.  A right internal jugular central

venous line is seen in place terminating in the expected location of the SVC.

There is no evidence of pneumothorax.  A multi lead pacemaker is again seen with

fracture of two of its three leads as described previously.

 

Impression:

 

No pneumothorax seen.  Right IJ line appears to be in the expected location of

the SVC.  NG tube and endotracheal tubes in good position.  Cardiomegaly again

noted.

 

 

Electronically Signed by

Darrin Leal MD 12/14/2019 12:53 P

## 2019-12-14 NOTE — REP
Portable chest x-ray:  Single view.

 

History:  Intubation film.

 

Comparison chest x-ray:  March 17, 2008.

 

Findings:  Endotracheal tube is in good position at the level of the proximal

clavicles.  A multi lead pacemaker is seen in the right heart via the left side.

There are two fractured leads involves the three in the subclavicular region. One

of the fractured leads has retracted into the right ventricle and is seen with an

additional loop in the right ventricle.  The third lead remains intact.  This

appears to be the other right ventricular lead.

 

Cardiomegaly is observed.  This is moderate and new.  Pleural angles are sharp.

Aorta is tortuous.

 

Impression:

 

Endotracheal tube in good position.  Moderate new cardiomegaly.  The atrial and

one of the ventricular cardiac pacemaker leads are fractured.  One of the right

ventricular cardiac leads has retracted and is looped upon itself in the right

ventricle.

 

 

Electronically Signed by

Darrin Leal MD 12/14/2019 09:20 A

## 2019-12-14 NOTE — ECGEPIP
[facility] - ED

                                       

                                       Test Date:    2019

Pat Name:     TOM VIRK        Department:   

Patient ID:   [id number]                 Room:         -

Gender:       Female                   Technician:   

:          [id number]               Requested By: Maja Lundborg-Gray 

Order Number: [id number]     Reading MD:   Malik Wilkerson

                                 Measurements

Intervals                              Axis          

Rate:         60                       P:            

WA:           0                        QRS:          -71

QRSD:         168                      T:            103

QT:           438                                    

QTc:          438                                    

                           Interpretive Statements

ELECTRONIC VENTRICULAR PACEMAKER

Comparison tracing not on file

Electronically Signed on 2019 22:08:43 EST by Malik Wilkerson

## 2019-12-15 NOTE — RO
DATE OF PROCEDURE:  12/14/2019

 

PROCEDURE:  Femoral arterial line.

 

INDICATION:  Hemodynamic monitoring.

 

PREPROCEDURE DIAGNOSIS:  Cardiac arrest.

 

POSTPROCEDURE DIAGNOSIS:  Cardiac arrest.

 

ATTENDING PHYSICIAN:  Dr. Gia Ga

 

CONSENT:  Due to the emergent nature of the procedure, consent was implied.

 

PROCEDURE SUMMARY:  A time out was performed.  Full sterile technique was

maintained throughout the procedure including surgical cap, mask, protective eye

wear, sterile gown and sterile gloves.  The left inguinal region was prepped

using chlorhexidine scrub and draped in a sterile fashion using a fenestrated

drape and a sterile probe cover was employed.  Using real time out of plane

ultrasound guidance, the left femoral artery was identified and an introducer

needle was inserted into the femoral artery.  Arterial blood was withdrawn.  The

syringe was removed and a guidewire was advanced through the needle into the

femoral artery.  The needle was then exchanged over the wire for an arterial

catheter.  The wire was removed and the catheter was secured to skin using a

suture.  The patient tolerated procedure without any hemodynamic compromise.  At

time of procedure completion, the catheter was connected to the cardiac monitor

and calibrated with appropriate waveform and blood pressure tracing observed.

Estimated blood loss is less than 5 mL.

MTDD

## 2019-12-15 NOTE — RO
DATE OF PROCEDURE:  12/14/2019

 

INDICATION: Hemodialysis access.

 

PREPROCEDURE DIAGNOSIS: Renal failure.

 

POSTPROCEDURE DIAGNOSIS: Renal failure.

 

ATTENDING PHYSICIAN: Gia Ga MD

 

CONSENT: Consent was obtained from the patient's  prior to the procedure.

Indication, risks, and benefits were explained at length.

 

PROCEDURE SUMMARY:

A central line insertion practice form was completed by an independent observer.

Time-out was performed.  Full sterile technique was maintained throughout

procedure including surgical cap, mask, protective eye, sterile gown and sterile

gloves. The right inguinal region was prepped using chlorhexidine scrub and

draped in a sterile fashion using a full drape and sterile probe cover was

employed.

 

The right femoral vein was identified using ultrasound and using real time out of

plane ultrasound guidance.  The introducer needle was inserted into the femoral

vein.  Venous blood was withdrawn.  The syringe was removed and a guidewire was

advanced into the introducer needle.  The needle was removed over the guidewire

and a small incision was made at skin surface with a scalpel.  Double dilation

was exchanged over the guidewire.  After appropriate dilation was obtained, the

dilator was exchanged over the wire for a double lumen hemodialysis catheter.

The wire was removed in the catheter was sutured in place.  A sterile

chlorhexidine impregnated dressing was placed over the catheter insertion site.

The patient tolerated the procedure without any hemodynamic compromise.

 

At time of procedure completion, all ports were aspirated and flushed properly.

Estimated blood loss is approximately 5 mL.

## 2019-12-15 NOTE — ECHO
DATE OF STUDY:  12/14/2019

INDICATION:  Cardiac arrest.

HEIGHT:  Not documented.

WEIGHT:  56 kg.

 

DIMENSIONS:

IVS:  1.2

LV:  3.8

LVPW:  1.1

LA:  2.2

Aorta:  2.8

IVC:  2.4

 

FINDINGS:

The study is of fair technical quality.  The patient is in sinus rhythm with

asynchronous ventricular pacing.

 

Left ventricle is normal size.  Borderline left ventricular hypertrophy (LVH) is

noted.  There is severe global hypokinesis, I estimate overall ejection fraction

approximately 20%.  The right ventricle appears dilated and hypokinetic.  There

is echo artifact apparent in the right atrium and right ventricle consistent with

pacemaker leads.  The left atrium appears markedly enlarged.  Cardiac valves were

poorly visualized.  There are sclerotic abnormalities of aortic and mitral

valves, but mobility seems preserved.  Tricuspid valve appears normal.  Pulmonic

valve was not well seen.

 

The inferior vena cava is markedly dilated and there is no appreciable collapse

with inspiration indicative of very high central venous pressure.  Aortic root is

normal.  Aortic arch also appears normal.  Abdominal aorta was not seen.

 

Doppler interrogation of aortic valve reveals no significant stenosis and

approximately mild to moderate insufficiency.  There is trace mitral

insufficiency and severe tricuspid insufficiency likely related to pacemaker

wires.

 

Evaluation of diastolic function is inconclusive due to asynchronous ventricular

pacing and lack of coordination between atrial and ventricular contraction.  But

tissue Doppler velocities of mitral annulus are very low and I consequently

assume advanced diastolic dysfunction.

 

CONCLUSIONS:

1.  Study is of fair technical quality.  The patient is in sinus rhythm with

asynchronous ventricular pacing.

2.  Normal LV size with borderline LVH and severe global hypokinesis, estimated

EF in the neighborhood of 20%, likely advanced diastolic dysfunction.

3.  Mild-to-moderate aortic insufficiency.

4.  Severe tricuspid insufficiency.

5.  Very high central venous pressure.

6.  At least mild pulmonary hypertension.

 

COMMENT:

Subacute bacterial endocarditis (SBE) prophylaxis is not recommended.  Both

ischemic and nonischemic etiology of cardiomyopathy is possible.

## 2019-12-15 NOTE — CR
DATE OF NEPHROLOGY CONSULTATION:  12/14/2019

 

REFERRING PHYSICIAN: Gia Ga MD

 

REASON FOR CONSULTATION: Oliguric acute renal failure and severe metabolic

acidosis in this lady with cardiac arrest.

 

HISTORY OF PRESENT ILLNESS

Mrs. Fortune is a 79-year-old female with known history of high degree AV block

and complete heart block, status post pacemaker placement, history of

hypertension, history of hyperlipidemia, irritable bowel syndrome, remote history

of GI bleed and right breast lumpectomy.  She presented to the emergency room via

EMS today as she was noticed to have cardiac arrest at home.  She was

resuscitated, intubated and brought to the emergency room.  She is now admitted

to intensive care unit.  She is currently on hypothermia protocol due to cardiac

arrest and resuscitation.  She has no urine output and has developed severe

metabolic acidosis.  She is hypotensive requiring pressors.  A nephrology

consultation was requested and the patient is seen this evening in intensive care

unit.

 

PAST MEDICAL AND SURGICAL HISTORY

Significant for

1.  Hypertension.

2.  Hyperlipidemia.

3.  Irritable bowel syndrome.

4   High degree AV block and a complete heart block, status post pacemaker

placement.

5.  History of right breast lumpectomy.

 

HOME MEDICATIONS:

She was on alendronate, multivitamins and no other known medications.

 

ALLERGIES: She has NO KNOWN DRUG ALLERGIES.

 

PERSONAL AND SOCIAL HISTORY

The patient herself is not able to provide any information.  At present, her

family has already left.  Apparently she has no history of smoking or alcohol

use.  She lives with her .

 

FAMILY HISTORY: Noncontributory.

 

REVIEW OF SYSTEMS

The patient is unresponsive and currently intubated.  Apparently she had vomiting

for couple of days at home and was feeling weak.  She collapsed this morning

which was witnessed by her  and EMS called.  She was shocked at home and

resuscitated and brought to emergency room.  There did have some pulseless

electrical activity and then ventricular tachycardia.  In any event, she is

currently unresponsive and unable to provide any information.

 

PHYSICAL EXAMINATION

Elderly lady intubated and unresponsive.  She is currently on pressors and her

blood pressure is about 116/58 mmHg.  Respiratory rate is 24 per minute on the

ventilator.  Temperature is 92.4 degrees Fahrenheit and heart rate 65 per minute.

Her head is atraumatic.  Pupils are equal and reactive to light.  Neck is supple

and internal jugular vein central venous line is in place on the right side.

Heart:  Sounds are regular and lungs have good bilateral air entry.  Abdomen is

soft and bowel sounds present.  Extremities:  Without any cyanosis or clubbing.

Neurologically, she is unresponsive.

 

LABORATORY DATA

WBC count 22.5, hemoglobin 15.2 and hematocrit 50.0.  Platelets 93.  Blood gas:

Initial one showed a pH of 7.14, pCO2 27.6, pO2 141 and bicarb 11.  Most recent

one pH is 7.12, pCO2 21, pO2 95 and bicarb 9.9.

 

Her chemistry showed a sodium level of 137, potassium 4.5, CO2 9, chloride 106,

BUN 51 and creatinine 1.79.  Glucose is 83 and a lactic acid level is up to 10.1.

, , alkaline phosphatase 119, total protein 6.2 and albumin 2.9.

 

PROBLEMS

1.  Oliguric acute renal failure.

2.  Severe metabolic acidosis with lactic acidosis.

3.  Status post cardiac arrest.

 

Most likely her oliguric acute renal failure is related to cardiac arrest and

prolonged hypoperfusion.  Her blood pressure is now maintained on pressors.

However, she has no urine output suggestive of acute tubular necrosis.  She also

has developed severe lactic acidosis and metabolic acidosis.  The patient was

given intravenous bicarb without any improvement in her pH.  She is going to

require urgent dialysis and in view of hypertension and requiring pressors, we

will start her on CRRT.  Dr. Ga has already discussed with the family and

already placed the dialysis catheter in her right femoral vein.  CRRT will be

initiated as soon as possible.  Her blood gas, chemistries and electrolytes will

be repeated every 12 hours.

 

Thank you for involving me in the care of Mrs. Fortune.  I will follow her

along with you.

## 2019-12-15 NOTE — RO
DATE OF PROCEDURE:  12/14/2019

 

INDICATION:  Vasopressor administration.

 

PREPROCEDURE DIAGNOSIS:  Cardiac arrest and shock.

 

POSTPROCEDURE DIAGNOSIS:  Cardiac arrest and shock.

 

PROCEDURE:  Internal jugular central line.

 

ATTENDING PHYSICIAN:  Dr. Gia Ga

 

CONSENT:  The procedure was performed emergently and the permission was implied

because of the emergent nature.

 

PROCEDURE SUMMARY:  A central line insertion practice form was completed by the

intermittent .  A time out was performed.  Full sterile technique was

maintained throughout the procedure, including surgical cap, mask, protective 
eye

wear, full gown and sterile gloves.  The patient was placed in Trendelenburg

position.  The right neck region was prepped using chlorhexidine scrub and 
draped

in sterile fashion using a full drape and sterile probe cover employed.  The

right internal jugular vein was identified using the ultrasound.  Using real 
time

out of plane guidance, the introducer needle was inserted into the right 
internal

jugular vein under direct ultrasound visualization.  Venous blood was drawn.  
The

syringe was removed and a guide wire was advanced into the introducer needle.

The needle was removed over the guidewire and a small incision was made at skin

surface with the scalpel and a dilator was exchanged over the guidewire.  After

appropriate dilation was obtained, the dilator was exchanged over the wire for a

triple lumen central venous catheter.  The wire was removed and the catheter was

sutured in place at 17 cm.  A sterile chlorhexidine impregnated dressing was

placed over the catheter at the insertion site.  The patient tolerated the

procedure without any hemodynamic compromise.  At time of procedure completion,

all ports aspirated and flushed properly.  A postprocedure chest x-ray shows the

line in good position with no pneumothorax.

[facility]